# Patient Record
Sex: FEMALE | Race: WHITE | NOT HISPANIC OR LATINO | URBAN - METROPOLITAN AREA
[De-identification: names, ages, dates, MRNs, and addresses within clinical notes are randomized per-mention and may not be internally consistent; named-entity substitution may affect disease eponyms.]

---

## 2023-04-17 ENCOUNTER — OFFICE VISIT (OUTPATIENT)
Dept: URGENT CARE | Facility: CLINIC | Age: 88
End: 2023-04-17
Payer: MEDICARE

## 2023-04-17 VITALS
SYSTOLIC BLOOD PRESSURE: 126 MMHG | BODY MASS INDEX: 25.95 KG/M2 | HEIGHT: 62 IN | WEIGHT: 141 LBS | HEART RATE: 106 BPM | OXYGEN SATURATION: 96 % | TEMPERATURE: 98 F | RESPIRATION RATE: 17 BRPM | DIASTOLIC BLOOD PRESSURE: 72 MMHG

## 2023-04-17 DIAGNOSIS — K59.00 CONSTIPATION, UNSPECIFIED CONSTIPATION TYPE: ICD-10-CM

## 2023-04-17 DIAGNOSIS — M54.6 BILATERAL THORACIC BACK PAIN, UNSPECIFIED CHRONICITY: ICD-10-CM

## 2023-04-17 DIAGNOSIS — R31.9 HEMATURIA, UNSPECIFIED TYPE: Primary | ICD-10-CM

## 2023-04-17 DIAGNOSIS — M25.512 ACUTE PAIN OF LEFT SHOULDER: ICD-10-CM

## 2023-04-17 PROBLEM — N18.30 STAGE 3 CHRONIC KIDNEY DISEASE: Status: ACTIVE | Noted: 2023-01-04

## 2023-04-17 PROBLEM — I10 ESSENTIAL HYPERTENSION: Status: ACTIVE | Noted: 2021-05-18

## 2023-04-17 PROBLEM — H35.341 MACULAR HOLE OF RIGHT EYE: Status: ACTIVE | Noted: 2023-04-17

## 2023-04-17 PROBLEM — Z95.0 PACEMAKER: Status: ACTIVE | Noted: 2022-08-24

## 2023-04-17 PROBLEM — F41.9 ANXIETY: Status: ACTIVE | Noted: 2021-05-18

## 2023-04-17 PROBLEM — E78.5 DYSLIPIDEMIA: Status: ACTIVE | Noted: 2022-08-23

## 2023-04-17 PROBLEM — I49.3 VENTRICULAR PREMATURE DEPOLARIZATION: Status: ACTIVE | Noted: 2023-04-17

## 2023-04-17 PROBLEM — Z85.3 HISTORY OF RIGHT BREAST CANCER: Status: ACTIVE | Noted: 2023-01-10

## 2023-04-17 PROBLEM — E55.9 VITAMIN D DEFICIENCY: Status: ACTIVE | Noted: 2021-05-18

## 2023-04-17 PROBLEM — I83.90 VARICOSE VEINS: Status: ACTIVE | Noted: 2023-04-17

## 2023-04-17 PROBLEM — K22.2 LOWER ESOPHAGEAL RING: Status: ACTIVE | Noted: 2023-04-17

## 2023-04-17 PROBLEM — M81.0 OSTEOPOROSIS: Status: ACTIVE | Noted: 2021-05-18

## 2023-04-17 LAB
BILIRUB UR QL STRIP: NEGATIVE
GLUCOSE UR QL STRIP: NEGATIVE
KETONES UR QL STRIP: NEGATIVE
LEUKOCYTE ESTERASE UR QL STRIP: NEGATIVE
PH, POC UA: 5.5 (ref 5–8)
POC BLOOD, URINE: NEGATIVE
POC NITRATES, URINE: NEGATIVE
PROT UR QL STRIP: NEGATIVE
SP GR UR STRIP: 1.01 (ref 1–1.03)
UROBILINOGEN UR STRIP-ACNC: NORMAL (ref 0.1–1.1)

## 2023-04-17 PROCEDURE — 81003 POCT URINALYSIS, DIPSTICK, AUTOMATED, W/O SCOPE: ICD-10-PCS | Mod: QW,S$GLB,, | Performed by: NURSE PRACTITIONER

## 2023-04-17 PROCEDURE — 99203 OFFICE O/P NEW LOW 30 MIN: CPT | Mod: S$GLB,,, | Performed by: NURSE PRACTITIONER

## 2023-04-17 PROCEDURE — 99203 PR OFFICE/OUTPT VISIT, NEW, LEVL III, 30-44 MIN: ICD-10-PCS | Mod: S$GLB,,, | Performed by: NURSE PRACTITIONER

## 2023-04-17 PROCEDURE — 81003 URINALYSIS AUTO W/O SCOPE: CPT | Mod: QW,S$GLB,, | Performed by: NURSE PRACTITIONER

## 2023-04-17 PROCEDURE — 87086 URINE CULTURE/COLONY COUNT: CPT | Performed by: NURSE PRACTITIONER

## 2023-04-17 RX ORDER — AMLODIPINE BESYLATE 2.5 MG/1
TABLET ORAL
COMMUNITY
Start: 2023-03-31

## 2023-04-17 RX ORDER — CHOLECALCIFEROL (VITAMIN D3) 125 MCG
CAPSULE ORAL
COMMUNITY

## 2023-04-17 RX ORDER — METOPROLOL SUCCINATE 50 MG/1
TABLET, EXTENDED RELEASE ORAL
COMMUNITY
Start: 2023-02-21

## 2023-04-17 RX ORDER — AMOXICILLIN 500 MG
CAPSULE ORAL DAILY
COMMUNITY

## 2023-04-17 RX ORDER — EZETIMIBE 10 MG/1
TABLET ORAL
COMMUNITY
Start: 2023-01-10

## 2023-04-17 RX ORDER — ASPIRIN 81 MG/1
81 TABLET ORAL DAILY
COMMUNITY

## 2023-04-17 NOTE — PATIENT INSTRUCTIONS
Urine culture is pending and results should be back in 2-3 days  Please call [phone] for results if you do not hear from us in 3-4 days   Follow up with gynecology     Try OTC glycerine suppository  Continue miralax  Increase water intake  Increase fiber intake  Increase activity level, as tolerated    Wipe front to back  Take your time on the toilet  Do not hold in your urine    Rest your shoulder  Ice packs and alternate with heating pad  Consider arm sling if pain is unbearable  Tylenol for pain

## 2023-04-17 NOTE — PROGRESS NOTES
"Subjective:      Patient ID: Hu Bonilla is a 88 y.o. female.    Vitals:  height is 5' 2" (1.575 m) and weight is 64 kg (141 lb). Her oral temperature is 98.1 °F (36.7 °C). Her blood pressure is 126/72 and her pulse is 106. Her respiration is 17 and oxygen saturation is 96%.     Chief Complaint: Dysuria    89 y/o female presents to  today with c/o one episode of blood with blood clot that seemed to be from the urine. This occurred earlier in the week. Pt had order for urinalysis sent to [facility], in which she has already completed. The test was negative for blood and infection, but pt unsure if she should trust [facility]. She would like the test repeated with [facility]. She is also c/o back pain-over the kidneys, so is concerned of kidney infection. Left sided back pain is worse than the right. Denies fever. Denies h/o kidney stone. Having some lower left sided belly pain, as well. She reports a h/o constipation. She does not think the blood was from the stool or straining, as she did not have a BM, nor was straining when she saw the blood. She also has concern the blood could be uterine. She further reports a left shoulder injury 1-2 months ago, in which she sustained bruising to the shoulder by bumping into her door frame. She is still having tenderness to the shoulder. She still has mild bruising. And she is concerned of decreased movement due to the pain.     Other  This is a new problem. The current episode started in the past 7 days. Associated symptoms include abdominal pain. Pertinent negatives include no anorexia, arthralgias, change in bowel habit, chest pain, chills, congestion, coughing, diaphoresis, fatigue, fever, headaches, joint swelling, myalgias, nausea, neck pain, numbness, rash, sore throat, swollen glands, urinary symptoms, vertigo, visual change, vomiting or weakness. Nothing aggravates the symptoms.     Constitution: Negative for chills, sweating, fatigue and fever.   HENT:  Negative for " congestion and sore throat.    Neck: Negative for neck pain.   Cardiovascular:  Negative for chest pain.   Respiratory:  Negative for cough.    Gastrointestinal:  Positive for abdominal pain and constipation. Negative for nausea and vomiting.   Genitourinary:  Positive for dysuria, frequency, urgency and hematuria (vs vaginal bleeding x1 episode).   Musculoskeletal:  Positive for pain, trauma and back pain. Negative for joint pain, joint swelling and muscle ache.   Skin:  Negative for rash.   Neurological:  Negative for history of vertigo, headaches and numbness.    Objective:     Physical Exam   Constitutional: She is oriented to person, place, and time.  Non-toxic appearance. She does not appear ill. No distress.   HENT:   Head: Normocephalic.   Nose: Nose normal.   Mouth/Throat: Mucous membranes are moist.   Eyes: Right eye exhibits no discharge. Left eye exhibits no discharge.   Neck: Neck supple. No neck rigidity present.   Cardiovascular: Tachycardia present.   Pulmonary/Chest: Effort normal.   Abdominal: Normal appearance. She exhibits no distension. Soft. flat abdomen There is abdominal tenderness. There is no rebound, no guarding, no left CVA tenderness and no right CVA tenderness.      Comments: Mild tenderness to left lower abdomen to palpation; no apparent significant pain to bilateral CVA to palpation    Musculoskeletal:         General: Tenderness and signs of injury present. No swelling or deformity.   Neurological: She is alert and oriented to person, place, and time.   Skin: Skin is warm, dry and not diaphoretic. bruising         Comments: Mild bruising noted to left upper arm near shoulder. Pt has good range of motion, but slightly decreased due to the pain. No obvious deformity. No edema.   Psychiatric: Her behavior is normal. Mood normal.   Nursing note and vitals reviewed.    Assessment:     1. Hematuria, unspecified type    2. Constipation, unspecified constipation type    3. Bilateral thoracic  back pain, unspecified chronicity    4. Acute pain of left shoulder        Plan:       Hematuria, unspecified type  -     POCT Urinalysis, Dipstick, Automated, W/O Scope  -     CULTURE, URINE    Constipation, unspecified constipation type    Bilateral thoracic back pain, unspecified chronicity  -     CULTURE, URINE    Acute pain of left shoulder      Patient Instructions   Urine culture is pending and results should be back in 2-3 days  Please call [phone] for results if you do not hear from us in 3-4 days   Follow up with gynecology     Try OTC glycerine suppository  Continue miralax  Increase water intake  Increase fiber intake  Increase activity level, as tolerated    Wipe front to back  Take your time on the toilet  Do not hold in your urine    Rest your shoulder  Ice packs and alternate with heating pad  Consider arm sling if pain is unbearable  Tylenol for pain

## 2023-04-18 LAB — BACTERIA UR CULT: NORMAL

## 2023-04-23 ENCOUNTER — TELEPHONE (OUTPATIENT)
Dept: URGENT CARE | Facility: CLINIC | Age: 88
End: 2023-04-23